# Patient Record
Sex: FEMALE | Race: WHITE | NOT HISPANIC OR LATINO | URBAN - METROPOLITAN AREA
[De-identification: names, ages, dates, MRNs, and addresses within clinical notes are randomized per-mention and may not be internally consistent; named-entity substitution may affect disease eponyms.]

---

## 2023-08-02 ENCOUNTER — OFFICE VISIT (OUTPATIENT)
Dept: PRIMARY CARE | Facility: CLINIC | Age: 43
End: 2023-08-02
Payer: COMMERCIAL

## 2023-08-02 VITALS
HEART RATE: 54 BPM | DIASTOLIC BLOOD PRESSURE: 82 MMHG | SYSTOLIC BLOOD PRESSURE: 134 MMHG | BODY MASS INDEX: 24.69 KG/M2 | WEIGHT: 135 LBS

## 2023-08-02 DIAGNOSIS — Z01.818 PREOPERATIVE CLEARANCE: Primary | ICD-10-CM

## 2023-08-02 DIAGNOSIS — Q21.12 PFO (PATENT FORAMEN OVALE) (HHS-HCC): ICD-10-CM

## 2023-08-02 PROBLEM — J30.2 SEASONAL ALLERGIES: Status: ACTIVE | Noted: 2023-08-02

## 2023-08-02 PROBLEM — Z86.73 HISTORY OF THROMBOTIC STROKE WITHOUT RESIDUAL DEFICITS: Status: ACTIVE | Noted: 2019-04-04

## 2023-08-02 PROBLEM — J34.2 NASAL SEPTAL DEVIATION: Status: ACTIVE | Noted: 2023-08-02

## 2023-08-02 PROBLEM — R53.83 FATIGUE: Status: ACTIVE | Noted: 2023-08-02

## 2023-08-02 PROBLEM — R09.81 CHRONIC NASAL CONGESTION: Status: ACTIVE | Noted: 2023-08-02

## 2023-08-02 PROBLEM — G47.33 OBSTRUCTIVE SLEEP APNEA: Status: ACTIVE | Noted: 2023-08-02

## 2023-08-02 PROBLEM — F41.9 ANXIETY: Status: ACTIVE | Noted: 2023-08-02

## 2023-08-02 PROBLEM — H93.293 ABNORMAL AUDITORY PERCEPTION OF BOTH EARS: Status: ACTIVE | Noted: 2023-08-02

## 2023-08-02 PROBLEM — R51.9 HEADACHE: Status: ACTIVE | Noted: 2020-02-05

## 2023-08-02 PROBLEM — R93.1 ABNORMAL FINDING ON ECHOCARDIOGRAM: Status: ACTIVE | Noted: 2023-08-02

## 2023-08-02 PROBLEM — R10.11 CHRONIC RUQ PAIN: Status: ACTIVE | Noted: 2023-08-02

## 2023-08-02 PROBLEM — N92.0 MENORRHAGIA WITH REGULAR CYCLE: Status: ACTIVE | Noted: 2022-06-14

## 2023-08-02 PROBLEM — Z97.5 BREAKTHROUGH BLEEDING ASSOCIATED WITH INTRAUTERINE DEVICE (IUD): Status: ACTIVE | Noted: 2022-06-14

## 2023-08-02 PROBLEM — G89.29 CHRONIC RUQ PAIN: Status: ACTIVE | Noted: 2023-08-02

## 2023-08-02 PROBLEM — R42 DIZZINESS: Status: ACTIVE | Noted: 2023-08-02

## 2023-08-02 PROBLEM — R19.07 GENERALIZED ABDOMINAL OR PELVIC SWELLING OR MASS OR LUMP: Status: ACTIVE | Noted: 2019-04-04

## 2023-08-02 PROBLEM — K21.9 GERD (GASTROESOPHAGEAL REFLUX DISEASE): Status: ACTIVE | Noted: 2023-08-01

## 2023-08-02 PROBLEM — G43.909 MIGRAINE HEADACHE: Status: ACTIVE | Noted: 2023-08-02

## 2023-08-02 PROBLEM — N92.1 BREAKTHROUGH BLEEDING ASSOCIATED WITH INTRAUTERINE DEVICE (IUD): Status: ACTIVE | Noted: 2022-06-14

## 2023-08-02 PROBLEM — D21.9 FIBROIDS: Status: ACTIVE | Noted: 2022-06-14

## 2023-08-02 PROBLEM — N94.5 SECONDARY DYSMENORRHEA: Status: ACTIVE | Noted: 2019-04-04

## 2023-08-02 PROBLEM — N94.12 DEEP DYSPAREUNIA: Status: ACTIVE | Noted: 2022-06-14

## 2023-08-02 PROBLEM — J32.9 RECURRENT SINUSITIS: Status: ACTIVE | Noted: 2023-08-02

## 2023-08-02 PROCEDURE — 99214 OFFICE O/P EST MOD 30 MIN: CPT | Performed by: FAMILY MEDICINE

## 2023-08-02 PROCEDURE — 1036F TOBACCO NON-USER: CPT | Performed by: FAMILY MEDICINE

## 2023-08-02 RX ORDER — TRAMADOL HYDROCHLORIDE 50 MG/1
TABLET ORAL
COMMUNITY
Start: 2023-04-11 | End: 2024-02-09 | Stop reason: ALTCHOICE

## 2023-08-02 RX ORDER — MECLIZINE HYDROCHLORIDE 25 MG/1
25 TABLET ORAL AS NEEDED
COMMUNITY
Start: 2020-02-10

## 2023-08-02 RX ORDER — CITALOPRAM 40 MG/1
40 TABLET, FILM COATED ORAL DAILY
COMMUNITY
Start: 2023-07-11

## 2023-08-02 RX ORDER — PANTOPRAZOLE SODIUM 40 MG/1
40 TABLET, DELAYED RELEASE ORAL DAILY
COMMUNITY

## 2023-08-02 RX ORDER — FERROUS SULFATE 325(65) MG
325 TABLET ORAL
Qty: 30 TABLET | Refills: 2 | COMMUNITY
Start: 2023-06-15 | End: 2023-09-13

## 2023-08-02 RX ORDER — NAPROXEN SODIUM 220 MG/1
81 TABLET, FILM COATED ORAL
COMMUNITY
Start: 2020-02-06

## 2023-08-02 ASSESSMENT — ENCOUNTER SYMPTOMS
DIZZINESS: 0
FACIAL ASYMMETRY: 0
CHEST TIGHTNESS: 0
HEADACHES: 0
ARTHRALGIAS: 0
COUGH: 0
BACK PAIN: 0
CHOKING: 0
ACTIVITY CHANGE: 0
FATIGUE: 0
FEVER: 0
APPETITE CHANGE: 0
COLOR CHANGE: 0
LIGHT-HEADEDNESS: 0
PALPITATIONS: 0

## 2023-08-02 NOTE — PROGRESS NOTES
Subjective   Patient ID: Sarahi Carl is a 43 y.o. female who presents for Pre-op Exam (Dr Mathis 8-8-23. Hysterectomy).    HPI   Patient presents for surgical clearance for hysterectomy.     Fam Hx  Mom () living,  Dad () living,     Exercise walks, runs  ETOH denies  Caffeine denies  Tobacco denies     OB/GYN Dr. Day     Mammogram due 2023  DEXA @ 67  Colonoscopy @ 47     Patient denies other complaints.   Review of Systems   Constitutional:  Negative for activity change, appetite change, fatigue and fever.   HENT:  Negative for congestion.    Respiratory:  Negative for cough, choking and chest tightness.    Cardiovascular:  Negative for chest pain, palpitations and leg swelling.   Genitourinary:  Positive for vaginal bleeding.   Musculoskeletal:  Negative for arthralgias, back pain and gait problem.   Skin:  Negative for color change and pallor.   Neurological:  Negative for dizziness, facial asymmetry, light-headedness and headaches.       Objective   /82 (BP Location: Right arm)   Pulse 54   Wt 61.2 kg (135 lb)   BMI 24.69 kg/m²   BSA Body surface area is 1.64 meters squared.      Physical Exam  Constitutional:       General: She is not in acute distress.     Appearance: Normal appearance. She is not toxic-appearing.   HENT:      Head: Normocephalic.      Right Ear: Tympanic membrane, ear canal and external ear normal.      Left Ear: Tympanic membrane, ear canal and external ear normal.      Nose: Nose normal.      Mouth/Throat:      Pharynx: Oropharynx is clear.   Eyes:      Conjunctiva/sclera: Conjunctivae normal.      Pupils: Pupils are equal, round, and reactive to light.   Cardiovascular:      Rate and Rhythm: Normal rate and regular rhythm.      Pulses: Normal pulses.      Heart sounds: Normal heart sounds.   Pulmonary:      Effort: No respiratory distress.      Breath sounds: No wheezing, rhonchi or rales.   Abdominal:      General: Bowel sounds are normal. There is no distension.       Palpations: Abdomen is soft.      Tenderness: There is no abdominal tenderness.   Musculoskeletal:         General: No swelling or tenderness.      Cervical back: No tenderness.   Skin:     Findings: No lesion or rash.   Neurological:      General: No focal deficit present.      Mental Status: She is alert and oriented to person, place, and time. Mental status is at baseline.      Gait: Gait normal.   Psychiatric:         Mood and Affect: Mood normal.         Behavior: Behavior normal.         Thought Content: Thought content normal.         Judgment: Judgment normal.       Legacy Encounter on 12/28/2022   Component Date Value Ref Range Status    WBC 12/28/2022 6.1  4.4 - 11.3 x10E9/L Final    RBC 12/28/2022 3.51 (L)  4.00 - 5.20 x10E12/L Final    Hemoglobin 12/28/2022 11.6 (L)  12.0 - 16.0 g/dL Final    Hematocrit 12/28/2022 36.2  36.0 - 46.0 % Final    MCV 12/28/2022 103 (H)  80 - 100 fL Final    MCHC 12/28/2022 32.0  32.0 - 36.0 g/dL Final    Platelets 12/28/2022 342  150 - 450 x10E9/L Final    RDW 12/28/2022 13.1  11.5 - 14.5 % Final    Neutrophils % 12/28/2022 56.1  40.0 - 80.0 % Final    Immature Granulocytes %, Automated 12/28/2022 0.7  0.0 - 0.9 % Final    Comment:  Immature Granulocyte Count (IG) includes promyelocytes,    myelocytes and metamyelocytes but does not include bands.   Percent differential counts (%) should be interpreted in the   context of the absolute cell counts (cells/L).      Lymphocytes % 12/28/2022 33.3  13.0 - 44.0 % Final    Monocytes % 12/28/2022 6.4  2.0 - 10.0 % Final    Eosinophils % 12/28/2022 2.5  0.0 - 6.0 % Final    Basophils % 12/28/2022 1.0  0.0 - 2.0 % Final    Neutrophils Absolute 12/28/2022 3.42  1.20 - 7.70 x10E9/L Final    Lymphocytes Absolute 12/28/2022 2.03  1.20 - 4.80 x10E9/L Final    Monocytes Absolute 12/28/2022 0.39  0.10 - 1.00 x10E9/L Final    Eosinophils Absolute 12/28/2022 0.15  0.00 - 0.70 x10E9/L Final    Basophils Absolute 12/28/2022 0.06  0.00 - 0.10  x10E9/L Final    Vitamin D, 25-Hydroxy 12/28/2022 20 (A)  ng/mL Final    Comment: .  DEFICIENCY:         < 20   NG/ML  INSUFFICIENCY:      20-29  NG/ML  SUFFICIENCY:         NG/ML    THIS ASSAY ACCURATELY QUANTIFIES THE SUM OF  VITAMIN D3, 25-HYDROXY AND VIT D2,25-HYDROXY.      TSH 12/28/2022 4.55 (H)  0.44 - 3.98 mIU/L Final    Comment:  TSH testing is performed using different testing    methodology at [facility] than at other    [facility]. Direct result comparisons should    only be made within the same method.      Glucose 12/28/2022 74  74 - 99 mg/dL Final    Sodium 12/28/2022 137  136 - 145 mmol/L Final    Potassium 12/28/2022 4.3  3.5 - 5.3 mmol/L Final    Chloride 12/28/2022 101  98 - 107 mmol/L Final    Bicarbonate 12/28/2022 28  21 - 32 mmol/L Final    Anion Gap 12/28/2022 12  10 - 20 mmol/L Final    Urea Nitrogen 12/28/2022 9  6 - 23 mg/dL Final    Creatinine 12/28/2022 0.73  0.50 - 1.05 mg/dL Final    GFR Female 12/28/2022 >90  >90 mL/min/1.73m2 Final    Comment:  CALCULATIONS OF ESTIMATED GFR ARE PERFORMED   USING THE 2021 CKD-EPI STUDY REFIT EQUATION   WITHOUT THE RACE VARIABLE FOR THE IDMS-TRACEABLE   CREATININE METHODS.    https://jasn.asnjournals.org/content/early/2021/09/22/ASN.0908181446      Calcium 12/28/2022 9.0  8.6 - 10.3 mg/dL Final    Albumin 12/28/2022 4.1  3.4 - 5.0 g/dL Final    Alkaline Phosphatase 12/28/2022 83  33 - 110 U/L Final    Total Protein 12/28/2022 7.2  6.4 - 8.2 g/dL Final    AST 12/28/2022 45 (H)  9 - 39 U/L Final    Total Bilirubin 12/28/2022 0.4  0.0 - 1.2 mg/dL Final    ALT (SGPT) 12/28/2022 47 (H)  7 - 45 U/L Final    Comment:  Patients treated with Sulfasalazine may generate    falsely decreased results for ALT.      Cholesterol 12/28/2022 213 (H)  0 - 199 mg/dL Final    Comment: .      AGE      DESIRABLE   BORDERLINE HIGH   HIGH     0-19 Y     0 - 169       170 - 199     >/= 200    20-24 Y     0 - 189       190 - 224     >/= 225         >24  Y     0 - 199       200 - 239     >/= 240   **All ranges are based on fasting samples. Specific   therapeutic targets will vary based on patient-specific   cardiac risk.  .   Pediatric guidelines reference:Pediatrics 2011, 128(S5).   Adult guidelines reference: NCEP ATPIII Guidelines,     DM 2001, 258:2486-97  .   Venipuncture immediately after or during the    administration of Metamizole may lead to falsely   low results. Testing should be performed immediately   prior to Metamizole dosing.      HDL 12/28/2022 53.5  mg/dL Final    Comment: .      AGE      VERY LOW   LOW     NORMAL    HIGH       0-19 Y       < 35   < 40     40-45     ----    20-24 Y       ----   < 40       >45     ----      >24 Y       ----   < 40     40-60      >60  .      Cholesterol/HDL Ratio 12/28/2022 4.0   Final    Comment: REF VALUES  DESIRABLE  < 3.4  HIGH RISK  > 5.0      LDL 12/28/2022 103 (H)  0 - 99 mg/dL Final    Comment: .                           NEAR      BORD      AGE      DESIRABLE  OPTIMAL    HIGH     HIGH     VERY HIGH     0-19 Y     0 - 109     ---    110-129   >/= 130     ----    20-24 Y     0 - 119     ---    120-159   >/= 160     ----      >24 Y     0 -  99   100-129  130-159   160-189     >/=190  .      VLDL 12/28/2022 56 (H)  0 - 40 mg/dL Final    Triglycerides 12/28/2022 281 (H)  0 - 149 mg/dL Final    Comment: .      AGE      DESIRABLE   BORDERLINE HIGH   HIGH     VERY HIGH   0 D-90 D    19 - 174         ----         ----        ----  91 D- 9 Y     0 -  74        75 -  99     >/= 100      ----    10-19 Y     0 -  89        90 - 129     >/= 130      ----    20-24 Y     0 - 114       115 - 149     >/= 150      ----         >24 Y     0 - 149       150 - 199    200- 499    >/= 500  .   Venipuncture immediately after or during the    administration of Metamizole may lead to falsely   low results. Testing should be performed immediately   prior to Metamizole dosing.      Non HDL Cholesterol 12/28/2022 160  mg/dL Final     Comment:     AGE      DESIRABLE   BORDERLINE HIGH   HIGH     VERY HIGH     0-19 Y     0 - 119       120 - 144     >/= 145    >/= 160    20-24 Y     0 - 149       150 - 189     >/= 190      ----         >24 Y    30 MG/DL ABOVE LDL CHOLESTEROL GOAL  .      Free T4 12/28/2022 0.76  0.61 - 1.12 ng/dL Final    Comment:  Thyroxine Free testing is performed using different testing    methodology at [facility] than at other    [facility]. Direct result comparisons should    only be made within the same method.  .   Biotin can cause falsely elevated free T4 results. Patients   taking a Biotin dose of up to 10 mg/day should refrain from   taking Biotin for 24 hours before sample collection. Patient   taking a Biotin dose of >10 mg/day should consult with their   physician or the laboratory before the blood draw.       Current Outpatient Medications on File Prior to Visit   Medication Sig Dispense Refill    aspirin 81 mg chewable tablet Chew 1 tablet (81 mg) once daily.      citalopram (CeleXA) 40 mg tablet Take 1 tablet (40 mg) by mouth once daily.      ferrous sulfate 325 (65 Fe) MG tablet Take 1 tablet (325 mg) by mouth once daily. 30 tablet 2    meclizine (Antivert) 25 mg tablet Take 1 tablet (25 mg) by mouth if needed.      pantoprazole (ProtoNix) 40 mg EC tablet Take 1 tablet (40 mg) by mouth once daily.      traMADol (Ultram) 50 mg tablet TAKE 1 TABLET BY MOUTH EVERY 6 HOURS FOR UP TO 7 DAYS AS NEEDED FOR PAIN       No current facility-administered medications on file prior to visit.     No images are attached to the encounter.            Assessment/Plan   Diagnoses and all orders for this visit:  Preoperative clearance  PFO (patent foramen ovale)    Patient is low risk for a moderate risk surgery, getting cardio clearance on August 7th, 2023  Patient to call if questions or concerns

## 2024-02-09 ENCOUNTER — OFFICE VISIT (OUTPATIENT)
Dept: PRIMARY CARE | Facility: CLINIC | Age: 44
End: 2024-02-09
Payer: COMMERCIAL

## 2024-02-09 VITALS
WEIGHT: 129 LBS | BODY MASS INDEX: 23.59 KG/M2 | TEMPERATURE: 97.3 F | HEART RATE: 71 BPM | SYSTOLIC BLOOD PRESSURE: 128 MMHG | DIASTOLIC BLOOD PRESSURE: 78 MMHG

## 2024-02-09 DIAGNOSIS — S01.81XD FACIAL LACERATION, SUBSEQUENT ENCOUNTER: Primary | ICD-10-CM

## 2024-02-09 PROCEDURE — 1036F TOBACCO NON-USER: CPT | Performed by: FAMILY MEDICINE

## 2024-02-09 PROCEDURE — 99214 OFFICE O/P EST MOD 30 MIN: CPT | Performed by: FAMILY MEDICINE

## 2024-02-09 RX ORDER — FAMOTIDINE 20 MG/1
20 TABLET, FILM COATED ORAL 2 TIMES DAILY
COMMUNITY
Start: 2023-08-23

## 2024-02-09 ASSESSMENT — ENCOUNTER SYMPTOMS
COLOR CHANGE: 0
COUGH: 0
DIZZINESS: 0
PALPITATIONS: 0
CHEST TIGHTNESS: 0
BACK PAIN: 0
HEADACHES: 0
APPETITE CHANGE: 0
CHOKING: 0
LIGHT-HEADEDNESS: 0
ACTIVITY CHANGE: 0
ARTHRALGIAS: 0
WOUND: 1
FACIAL ASYMMETRY: 0
FATIGUE: 0
FEVER: 0

## 2024-02-09 ASSESSMENT — PATIENT HEALTH QUESTIONNAIRE - PHQ9
SUM OF ALL RESPONSES TO PHQ9 QUESTIONS 1 AND 2: 0
10. IF YOU CHECKED OFF ANY PROBLEMS, HOW DIFFICULT HAVE THESE PROBLEMS MADE IT FOR YOU TO DO YOUR WORK, TAKE CARE OF THINGS AT HOME, OR GET ALONG WITH OTHER PEOPLE: NOT DIFFICULT AT ALL
1. LITTLE INTEREST OR PLEASURE IN DOING THINGS: NOT AT ALL
2. FEELING DOWN, DEPRESSED OR HOPELESS: NOT AT ALL

## 2024-02-09 NOTE — PROGRESS NOTES
"Subjective   Patient ID: Sarahi Booker is a 44 y.o. female who presents for Suture / Staple Removal (# 5 left side of face. ).    HPI   Reports she is here to have removal of 5 sutures from her face.  She reports she was seen in the ER on 2/4.   Patient reports that her son was disassembling his guitar she hit her face on the amp.    Review of Systems   Constitutional:  Negative for activity change, appetite change, fatigue and fever.   HENT:  Negative for congestion.    Respiratory:  Negative for cough, choking and chest tightness.    Cardiovascular:  Negative for chest pain, palpitations and leg swelling.   Musculoskeletal:  Negative for arthralgias, back pain and gait problem.   Skin:  Positive for rash and wound. Negative for color change and pallor.   Neurological:  Negative for dizziness, facial asymmetry, light-headedness and headaches.       Objective   /78 (BP Location: Right arm)   Pulse 71   Temp 36.3 °C (97.3 °F)   Wt 58.5 kg (129 lb)   BMI 23.59 kg/m²   BSA Body surface area is 1.6 meters squared.      Physical Exam  Constitutional:       General: She is not in acute distress.     Appearance: Normal appearance. She is not toxic-appearing.   HENT:      Head: Normocephalic.   Eyes:      Conjunctiva/sclera: Conjunctivae normal.      Pupils: Pupils are equal, round, and reactive to light.   Cardiovascular:      Rate and Rhythm: Normal rate and regular rhythm.      Pulses: Normal pulses.      Heart sounds: Normal heart sounds.   Pulmonary:      Effort: No respiratory distress.      Breath sounds: No wheezing, rhonchi or rales.   Musculoskeletal:         General: No swelling or tenderness.   Skin:     Findings: Lesion present. No rash.      Comments: 5 interrupted sutures 1/2\" well approximated laceration on left chin   Neurological:      General: No focal deficit present.      Mental Status: She is alert and oriented to person, place, and time. Mental status is at baseline.      Gait: Gait " normal.   Psychiatric:         Mood and Affect: Mood normal.         Behavior: Behavior normal.         Thought Content: Thought content normal.         Judgment: Judgment normal.       No visits with results within 1 Year(s) from this visit.   Latest known visit with results is:   Legacy Encounter on 12/28/2022   Component Date Value Ref Range Status    WBC 12/28/2022 6.1  4.4 - 11.3 x10E9/L Final    RBC 12/28/2022 3.51 (L)  4.00 - 5.20 x10E12/L Final    Hemoglobin 12/28/2022 11.6 (L)  12.0 - 16.0 g/dL Final    Hematocrit 12/28/2022 36.2  36.0 - 46.0 % Final    MCV 12/28/2022 103 (H)  80 - 100 fL Final    MCHC 12/28/2022 32.0  32.0 - 36.0 g/dL Final    Platelets 12/28/2022 342  150 - 450 x10E9/L Final    RDW 12/28/2022 13.1  11.5 - 14.5 % Final    Neutrophils % 12/28/2022 56.1  40.0 - 80.0 % Final    Immature Granulocytes %, Automated 12/28/2022 0.7  0.0 - 0.9 % Final    Comment:  Immature Granulocyte Count (IG) includes promyelocytes,    myelocytes and metamyelocytes but does not include bands.   Percent differential counts (%) should be interpreted in the   context of the absolute cell counts (cells/L).      Lymphocytes % 12/28/2022 33.3  13.0 - 44.0 % Final    Monocytes % 12/28/2022 6.4  2.0 - 10.0 % Final    Eosinophils % 12/28/2022 2.5  0.0 - 6.0 % Final    Basophils % 12/28/2022 1.0  0.0 - 2.0 % Final    Neutrophils Absolute 12/28/2022 3.42  1.20 - 7.70 x10E9/L Final    Lymphocytes Absolute 12/28/2022 2.03  1.20 - 4.80 x10E9/L Final    Monocytes Absolute 12/28/2022 0.39  0.10 - 1.00 x10E9/L Final    Eosinophils Absolute 12/28/2022 0.15  0.00 - 0.70 x10E9/L Final    Basophils Absolute 12/28/2022 0.06  0.00 - 0.10 x10E9/L Final    Vitamin D, 25-Hydroxy 12/28/2022 20 (A)  ng/mL Final    Comment: .  DEFICIENCY:         < 20   NG/ML  INSUFFICIENCY:      20-29  NG/ML  SUFFICIENCY:         NG/ML    THIS ASSAY ACCURATELY QUANTIFIES THE SUM OF  VITAMIN D3, 25-HYDROXY AND VIT D2,25-HYDROXY.      TSH 12/28/2022  4.55 (H)  0.44 - 3.98 mIU/L Final    Comment:  TSH testing is performed using different testing    methodology at [facility] than at other    [facility] hospitals. Direct result comparisons should    only be made within the same method.      Glucose 12/28/2022 74  74 - 99 mg/dL Final    Sodium 12/28/2022 137  136 - 145 mmol/L Final    Potassium 12/28/2022 4.3  3.5 - 5.3 mmol/L Final    Chloride 12/28/2022 101  98 - 107 mmol/L Final    Bicarbonate 12/28/2022 28  21 - 32 mmol/L Final    Anion Gap 12/28/2022 12  10 - 20 mmol/L Final    Urea Nitrogen 12/28/2022 9  6 - 23 mg/dL Final    Creatinine 12/28/2022 0.73  0.50 - 1.05 mg/dL Final    GFR Female 12/28/2022 >90  >90 mL/min/1.73m2 Final    Comment:  CALCULATIONS OF ESTIMATED GFR ARE PERFORMED   USING THE 2021 CKD-EPI STUDY REFIT EQUATION   WITHOUT THE RACE VARIABLE FOR THE IDMS-TRACEABLE   CREATININE METHODS.    https://jasn.asnjournals.org/content/early/2021/09/22/ASN.2392902811      Calcium 12/28/2022 9.0  8.6 - 10.3 mg/dL Final    Albumin 12/28/2022 4.1  3.4 - 5.0 g/dL Final    Alkaline Phosphatase 12/28/2022 83  33 - 110 U/L Final    Total Protein 12/28/2022 7.2  6.4 - 8.2 g/dL Final    AST 12/28/2022 45 (H)  9 - 39 U/L Final    Total Bilirubin 12/28/2022 0.4  0.0 - 1.2 mg/dL Final    ALT (SGPT) 12/28/2022 47 (H)  7 - 45 U/L Final    Comment:  Patients treated with Sulfasalazine may generate    falsely decreased results for ALT.      Cholesterol 12/28/2022 213 (H)  0 - 199 mg/dL Final    Comment: .      AGE      DESIRABLE   BORDERLINE HIGH   HIGH     0-19 Y     0 - 169       170 - 199     >/= 200    20-24 Y     0 - 189       190 - 224     >/= 225         >24 Y     0 - 199       200 - 239     >/= 240   **All ranges are based on fasting samples. Specific   therapeutic targets will vary based on patient-specific   cardiac risk.  .   Pediatric guidelines reference:Pediatrics 2011, 128(S5).   Adult guidelines reference: NCEP ATPIII Guidelines,     DM 2001,  258:[id number]  .   Venipuncture immediately after or during the    administration of Metamizole may lead to falsely   low results. Testing should be performed immediately   prior to Metamizole dosing.      HDL 12/28/2022 53.5  mg/dL Final    Comment: .      AGE      VERY LOW   LOW     NORMAL    HIGH       0-19 Y       < 35   < 40     40-45     ----    20-24 Y       ----   < 40       >45     ----      >24 Y       ----   < 40     40-60      >60  .      Cholesterol/HDL Ratio 12/28/2022 4.0   Final    Comment: REF VALUES  DESIRABLE  < 3.4  HIGH RISK  > 5.0      LDL 12/28/2022 103 (H)  0 - 99 mg/dL Final    Comment: .                           NEAR      BORD      AGE      DESIRABLE  OPTIMAL    HIGH     HIGH     VERY HIGH     0-19 Y     0 - 109     ---    110-129   >/= 130     ----    20-24 Y     0 - 119     ---    120-159   >/= 160     ----      >24 Y     0 -  99   100-129  130-159   160-189     >/=190  .      VLDL 12/28/2022 56 (H)  0 - 40 mg/dL Final    Triglycerides 12/28/2022 281 (H)  0 - 149 mg/dL Final    Comment: .      AGE      DESIRABLE   BORDERLINE HIGH   HIGH     VERY HIGH   0 D-90 D    19 - 174         ----         ----        ----  91 D- 9 Y     0 -  74        75 -  99     >/= 100      ----    10-19 Y     0 -  89        90 - 129     >/= 130      ----    20-24 Y     0 - 114       115 - 149     >/= 150      ----         >24 Y     0 - 149       150 - 199    200- 499    >/= 500  .   Venipuncture immediately after or during the    administration of Metamizole may lead to falsely   low results. Testing should be performed immediately   prior to Metamizole dosing.      Non HDL Cholesterol 12/28/2022 160  mg/dL Final    Comment:     AGE      DESIRABLE   BORDERLINE HIGH   HIGH     VERY HIGH     0-19 Y     0 - 119       120 - 144     >/= 145    >/= 160    20-24 Y     0 - 149       150 - 189     >/= 190      ----         >24 Y    30 MG/DL ABOVE LDL CHOLESTEROL GOAL  .      Free T4 12/28/2022 0.76  0.61 - 1.12 ng/dL Final     Comment:  Thyroxine Free testing is performed using different testing    methodology at [facility] than at other    [facility]. Direct result comparisons should    only be made within the same method.  .   Biotin can cause falsely elevated free T4 results. Patients   taking a Biotin dose of up to 10 mg/day should refrain from   taking Biotin for 24 hours before sample collection. Patient   taking a Biotin dose of >10 mg/day should consult with their   physician or the laboratory before the blood draw.       Current Outpatient Medications on File Prior to Visit   Medication Sig Dispense Refill    aspirin 81 mg chewable tablet Chew 1 tablet (81 mg) once daily.      famotidine (Pepcid) 20 mg tablet Take 1 tablet (20 mg) by mouth 2 times a day.      meclizine (Antivert) 25 mg tablet Take 1 tablet (25 mg) by mouth if needed.      pantoprazole (ProtoNix) 40 mg EC tablet Take 1 tablet (40 mg) by mouth once daily.      citalopram (CeleXA) 40 mg tablet Take 1 tablet (40 mg) by mouth once daily.      [DISCONTINUED] traMADol (Ultram) 50 mg tablet TAKE 1 TABLET BY MOUTH EVERY 6 HOURS FOR UP TO 7 DAYS AS NEEDED FOR PAIN       No current facility-administered medications on file prior to visit.     No images are attached to the encounter.            Assessment/Plan   Diagnoses and all orders for this visit:  Facial laceration, subsequent encounter    Patient to keep area clean and dry  Patient to call if questions or concerns

## 2024-02-14 ENCOUNTER — HOSPITAL ENCOUNTER (OUTPATIENT)
Dept: RADIOLOGY | Facility: CLINIC | Age: 44
Discharge: HOME | End: 2024-02-14
Payer: COMMERCIAL

## 2024-02-14 ENCOUNTER — OFFICE VISIT (OUTPATIENT)
Dept: PRIMARY CARE | Facility: CLINIC | Age: 44
End: 2024-02-14
Payer: COMMERCIAL

## 2024-02-14 VITALS
OXYGEN SATURATION: 97 % | DIASTOLIC BLOOD PRESSURE: 89 MMHG | TEMPERATURE: 97.8 F | HEART RATE: 70 BPM | RESPIRATION RATE: 16 BRPM | WEIGHT: 132 LBS | SYSTOLIC BLOOD PRESSURE: 151 MMHG | BODY MASS INDEX: 24.14 KG/M2

## 2024-02-14 DIAGNOSIS — M25.571 ACUTE RIGHT ANKLE PAIN: Primary | ICD-10-CM

## 2024-02-14 DIAGNOSIS — M25.571 ACUTE RIGHT ANKLE PAIN: ICD-10-CM

## 2024-02-14 PROCEDURE — 73610 X-RAY EXAM OF ANKLE: CPT | Mod: RIGHT SIDE | Performed by: RADIOLOGY

## 2024-02-14 PROCEDURE — 99213 OFFICE O/P EST LOW 20 MIN: CPT | Performed by: NURSE PRACTITIONER

## 2024-02-14 PROCEDURE — 73610 X-RAY EXAM OF ANKLE: CPT | Mod: RT

## 2024-02-14 PROCEDURE — 1036F TOBACCO NON-USER: CPT | Performed by: NURSE PRACTITIONER

## 2024-02-14 ASSESSMENT — PATIENT HEALTH QUESTIONNAIRE - PHQ9
SUM OF ALL RESPONSES TO PHQ9 QUESTIONS 1 AND 2: 0
1. LITTLE INTEREST OR PLEASURE IN DOING THINGS: NOT AT ALL
2. FEELING DOWN, DEPRESSED OR HOPELESS: NOT AT ALL
10. IF YOU CHECKED OFF ANY PROBLEMS, HOW DIFFICULT HAVE THESE PROBLEMS MADE IT FOR YOU TO DO YOUR WORK, TAKE CARE OF THINGS AT HOME, OR GET ALONG WITH OTHER PEOPLE: NOT DIFFICULT AT ALL

## 2024-02-14 ASSESSMENT — ENCOUNTER SYMPTOMS
ABDOMINAL PAIN: 0
LOSS OF SENSATION IN FEET: 0
SHORTNESS OF BREATH: 0
CHILLS: 0
NAUSEA: 0
DIARRHEA: 0
VOMITING: 0
COLOR CHANGE: 0
FEVER: 0
OCCASIONAL FEELINGS OF UNSTEADINESS: 0
DEPRESSION: 0
COUGH: 0

## 2024-02-14 NOTE — PATIENT INSTRUCTIONS
Obtain xr ordered   We will call you with results  Recommend 600mg ibuprofen twice daily for 5 days  May use tylenol   Recommend Ice 20 minutes twice daily

## 2024-02-14 NOTE — PROGRESS NOTES
Subjective   Chief Complaint: Ankle Pain (Right;  states she woke up with right ankle pain.  No injury or trauma recalled.).    HPI   Sarahi Booker is a 44 y.o. female who presents for Ankle Pain (Right;  states she woke up with right ankle pain.  No injury or trauma recalled.).      Reports right ankle pain that started this morning after waking up  Hurts to bare weight on her right side. Internal rotation of right foot hurts    Patient denies anything unusual yesterday. Reports that she works from home, went out to dinner yesterday and came home and got the kids stuff ready for school.    Denies any known injury.     Took tylenol and advil with mild relief.   Denies discoloration or swelling of the area. No redness       Review of Systems   Constitutional:  Negative for chills and fever.   Respiratory:  Negative for cough and shortness of breath.    Cardiovascular:  Negative for chest pain and leg swelling.   Gastrointestinal:  Negative for abdominal pain, diarrhea, nausea and vomiting.   Musculoskeletal:         Right ankle pain    Skin:  Negative for color change and rash.       Objective   /89 (BP Location: Left arm, Patient Position: Sitting, BP Cuff Size: Adult)   Pulse 70   Temp 36.6 °C (97.8 °F)   Resp 16   Wt 59.9 kg (132 lb)   LMP  (LMP Unknown)   SpO2 97%   BMI 24.14 kg/m²   BSA Body surface area is 1.62 meters squared.      Physical Exam  Constitutional:       Appearance: Normal appearance.   Musculoskeletal:         General: Tenderness present. No swelling or signs of injury.      Right lower leg: No edema.      Left lower leg: No edema.   Skin:     General: Skin is dry.      Findings: No rash.   Neurological:      General: No focal deficit present.      Mental Status: She is alert.       No visits with results within 1 Year(s) from this visit.   Latest known visit with results is:   Legacy Encounter on 12/28/2022   Component Date Value Ref Range Status    WBC 12/28/2022 6.1  4.4 -  11.3 x10E9/L Final    RBC 12/28/2022 3.51 (L)  4.00 - 5.20 x10E12/L Final    Hemoglobin 12/28/2022 11.6 (L)  12.0 - 16.0 g/dL Final    Hematocrit 12/28/2022 36.2  36.0 - 46.0 % Final    MCV 12/28/2022 103 (H)  80 - 100 fL Final    MCHC 12/28/2022 32.0  32.0 - 36.0 g/dL Final    Platelets 12/28/2022 342  150 - 450 x10E9/L Final    RDW 12/28/2022 13.1  11.5 - 14.5 % Final    Neutrophils % 12/28/2022 56.1  40.0 - 80.0 % Final    Immature Granulocytes %, Automated 12/28/2022 0.7  0.0 - 0.9 % Final    Comment:  Immature Granulocyte Count (IG) includes promyelocytes,    myelocytes and metamyelocytes but does not include bands.   Percent differential counts (%) should be interpreted in the   context of the absolute cell counts (cells/L).      Lymphocytes % 12/28/2022 33.3  13.0 - 44.0 % Final    Monocytes % 12/28/2022 6.4  2.0 - 10.0 % Final    Eosinophils % 12/28/2022 2.5  0.0 - 6.0 % Final    Basophils % 12/28/2022 1.0  0.0 - 2.0 % Final    Neutrophils Absolute 12/28/2022 3.42  1.20 - 7.70 x10E9/L Final    Lymphocytes Absolute 12/28/2022 2.03  1.20 - 4.80 x10E9/L Final    Monocytes Absolute 12/28/2022 0.39  0.10 - 1.00 x10E9/L Final    Eosinophils Absolute 12/28/2022 0.15  0.00 - 0.70 x10E9/L Final    Basophils Absolute 12/28/2022 0.06  0.00 - 0.10 x10E9/L Final    Vitamin D, 25-Hydroxy 12/28/2022 20 (A)  ng/mL Final    Comment: .  DEFICIENCY:         < 20   NG/ML  INSUFFICIENCY:      20-29  NG/ML  SUFFICIENCY:         NG/ML    THIS ASSAY ACCURATELY QUANTIFIES THE SUM OF  VITAMIN D3, 25-HYDROXY AND VIT D2,25-HYDROXY.      TSH 12/28/2022 4.55 (H)  0.44 - 3.98 mIU/L Final    Comment:  TSH testing is performed using different testing    methodology at [facility] than at other    [facility] hospitals. Direct result comparisons should    only be made within the same method.      Glucose 12/28/2022 74  74 - 99 mg/dL Final    Sodium 12/28/2022 137  136 - 145 mmol/L Final    Potassium 12/28/2022 4.3  3.5 - 5.3  mmol/L Final    Chloride 12/28/2022 101  98 - 107 mmol/L Final    Bicarbonate 12/28/2022 28  21 - 32 mmol/L Final    Anion Gap 12/28/2022 12  10 - 20 mmol/L Final    Urea Nitrogen 12/28/2022 9  6 - 23 mg/dL Final    Creatinine 12/28/2022 0.73  0.50 - 1.05 mg/dL Final    GFR Female 12/28/2022 >90  >90 mL/min/1.73m2 Final    Comment:  CALCULATIONS OF ESTIMATED GFR ARE PERFORMED   USING THE 2021 CKD-EPI STUDY REFIT EQUATION   WITHOUT THE RACE VARIABLE FOR THE IDMS-TRACEABLE   CREATININE METHODS.    https://jasn.asnjournals.org/content/early/2021/09/22/ASN.7962970600      Calcium 12/28/2022 9.0  8.6 - 10.3 mg/dL Final    Albumin 12/28/2022 4.1  3.4 - 5.0 g/dL Final    Alkaline Phosphatase 12/28/2022 83  33 - 110 U/L Final    Total Protein 12/28/2022 7.2  6.4 - 8.2 g/dL Final    AST 12/28/2022 45 (H)  9 - 39 U/L Final    Total Bilirubin 12/28/2022 0.4  0.0 - 1.2 mg/dL Final    ALT (SGPT) 12/28/2022 47 (H)  7 - 45 U/L Final    Comment:  Patients treated with Sulfasalazine may generate    falsely decreased results for ALT.      Cholesterol 12/28/2022 213 (H)  0 - 199 mg/dL Final    Comment: .      AGE      DESIRABLE   BORDERLINE HIGH   HIGH     0-19 Y     0 - 169       170 - 199     >/= 200    20-24 Y     0 - 189       190 - 224     >/= 225         >24 Y     0 - 199       200 - 239     >/= 240   **All ranges are based on fasting samples. Specific   therapeutic targets will vary based on patient-specific   cardiac risk.  .   Pediatric guidelines reference:Pediatrics 2011, 128(S5).   Adult guidelines reference: NCEP ATPIII Guidelines,     DM 2001, 258:2486-97  .   Venipuncture immediately after or during the    administration of Metamizole may lead to falsely   low results. Testing should be performed immediately   prior to Metamizole dosing.      HDL 12/28/2022 53.5  mg/dL Final    Comment: .      AGE      VERY LOW   LOW     NORMAL    HIGH       0-19 Y       < 35   < 40     40-45     ----    20-24 Y       ----   < 40        >45     ----      >24 Y       ----   < 40     40-60      >60  .      Cholesterol/HDL Ratio 12/28/2022 4.0   Final    Comment: REF VALUES  DESIRABLE  < 3.4  HIGH RISK  > 5.0      LDL 12/28/2022 103 (H)  0 - 99 mg/dL Final    Comment: .                           NEAR      BORD      AGE      DESIRABLE  OPTIMAL    HIGH     HIGH     VERY HIGH     0-19 Y     0 - 109     ---    110-129   >/= 130     ----    20-24 Y     0 - 119     ---    120-159   >/= 160     ----      >24 Y     0 -  99   100-129  130-159   160-189     >/=190  .      VLDL 12/28/2022 56 (H)  0 - 40 mg/dL Final    Triglycerides 12/28/2022 281 (H)  0 - 149 mg/dL Final    Comment: .      AGE      DESIRABLE   BORDERLINE HIGH   HIGH     VERY HIGH   0 D-90 D    19 - 174         ----         ----        ----  91 D- 9 Y     0 -  74        75 -  99     >/= 100      ----    10-19 Y     0 -  89        90 - 129     >/= 130      ----    20-24 Y     0 - 114       115 - 149     >/= 150      ----         >24 Y     0 - 149       150 - 199    200- 499    >/= 500  .   Venipuncture immediately after or during the    administration of Metamizole may lead to falsely   low results. Testing should be performed immediately   prior to Metamizole dosing.      Non HDL Cholesterol 12/28/2022 160  mg/dL Final    Comment:     AGE      DESIRABLE   BORDERLINE HIGH   HIGH     VERY HIGH     0-19 Y     0 - 119       120 - 144     >/= 145    >/= 160    20-24 Y     0 - 149       150 - 189     >/= 190      ----         >24 Y    30 MG/DL ABOVE LDL CHOLESTEROL GOAL  .      Free T4 12/28/2022 0.76  0.61 - 1.12 ng/dL Final    Comment:  Thyroxine Free testing is performed using different testing    methodology at [facility] than at other    [facility]. Direct result comparisons should    only be made within the same method.  .   Biotin can cause falsely elevated free T4 results. Patients   taking a Biotin dose of up to 10 mg/day should refrain from   taking Biotin for 24 hours  before sample collection. Patient   taking a Biotin dose of >10 mg/day should consult with their   physician or the laboratory before the blood draw.       Current Outpatient Medications on File Prior to Visit   Medication Sig Dispense Refill    aspirin 81 mg chewable tablet Chew 1 tablet (81 mg) once daily.      pantoprazole (ProtoNix) 40 mg EC tablet Take 1 tablet (40 mg) by mouth once daily.      citalopram (CeleXA) 40 mg tablet Take 1 tablet (40 mg) by mouth once daily.      famotidine (Pepcid) 20 mg tablet Take 1 tablet (20 mg) by mouth 2 times a day.      meclizine (Antivert) 25 mg tablet Take 1 tablet (25 mg) by mouth if needed.      [DISCONTINUED] traMADol (Ultram) 50 mg tablet TAKE 1 TABLET BY MOUTH EVERY 6 HOURS FOR UP TO 7 DAYS AS NEEDED FOR PAIN       No current facility-administered medications on file prior to visit.     No images are attached to the encounter.            Assessment/Plan   Problem List Items Addressed This Visit    None  Visit Diagnoses         Codes    Acute right ankle pain    -  Primary M25.571

## 2024-03-04 ENCOUNTER — OFFICE VISIT (OUTPATIENT)
Dept: PRIMARY CARE | Facility: CLINIC | Age: 44
End: 2024-03-04
Payer: COMMERCIAL

## 2024-03-04 VITALS
WEIGHT: 127.6 LBS | DIASTOLIC BLOOD PRESSURE: 88 MMHG | BODY MASS INDEX: 23.34 KG/M2 | HEART RATE: 80 BPM | TEMPERATURE: 98.2 F | SYSTOLIC BLOOD PRESSURE: 130 MMHG

## 2024-03-04 DIAGNOSIS — J02.9 SORE THROAT: Primary | ICD-10-CM

## 2024-03-04 DIAGNOSIS — J20.9 ACUTE BRONCHITIS, UNSPECIFIED ORGANISM: ICD-10-CM

## 2024-03-04 LAB
POC RAPID INFLUENZA A: NEGATIVE
POC RAPID INFLUENZA B: NEGATIVE

## 2024-03-04 PROCEDURE — 1036F TOBACCO NON-USER: CPT | Performed by: FAMILY MEDICINE

## 2024-03-04 PROCEDURE — 87634 RSV DNA/RNA AMP PROBE: CPT

## 2024-03-04 PROCEDURE — 87804 INFLUENZA ASSAY W/OPTIC: CPT | Performed by: FAMILY MEDICINE

## 2024-03-04 PROCEDURE — 99214 OFFICE O/P EST MOD 30 MIN: CPT | Performed by: FAMILY MEDICINE

## 2024-03-04 PROCEDURE — 87635 SARS-COV-2 COVID-19 AMP PRB: CPT

## 2024-03-04 RX ORDER — AZITHROMYCIN 250 MG/1
TABLET, FILM COATED ORAL
Qty: 6 TABLET | Refills: 0 | Status: SHIPPED | OUTPATIENT
Start: 2024-03-04 | End: 2024-03-09

## 2024-03-04 ASSESSMENT — ENCOUNTER SYMPTOMS
LIGHT-HEADEDNESS: 0
DIZZINESS: 0
ACTIVITY CHANGE: 0
FEVER: 1
DIARRHEA: 1
CHEST TIGHTNESS: 0
COLOR CHANGE: 0
ARTHRALGIAS: 0
HEADACHES: 1
BACK PAIN: 0
SINUS PRESSURE: 0
PALPITATIONS: 0
SINUS PAIN: 0
NAUSEA: 1
SORE THROAT: 1
FATIGUE: 0
CHOKING: 0
COUGH: 1
FACIAL ASYMMETRY: 0
APPETITE CHANGE: 0

## 2024-03-04 NOTE — PROGRESS NOTES
Subjective   Patient ID: Sarahi Booker is a 44 y.o. female who presents for Cough (With chest congestion. X Saturday. ) and Nausea (With diarrhea, fever, headache. X Wednesday. Neg for covid. ).    HPI   Reports she has had difficulty with cough with chest congestion since Saturday.  Had nausea diarrhea fever headache since Wednesday did take a COVID test that was negative.  Review of Systems   Constitutional:  Positive for fever. Negative for activity change, appetite change and fatigue.   HENT:  Positive for sore throat. Negative for congestion, ear pain, sinus pressure and sinus pain.    Respiratory:  Positive for cough. Negative for choking and chest tightness.    Cardiovascular:  Negative for chest pain, palpitations and leg swelling.   Gastrointestinal:  Positive for diarrhea and nausea.   Musculoskeletal:  Negative for arthralgias, back pain and gait problem.   Skin:  Negative for color change and pallor.   Neurological:  Positive for headaches. Negative for dizziness, facial asymmetry and light-headedness.       Objective   /88 (BP Location: Left arm)   Pulse 80   Temp 36.8 °C (98.2 °F)   Wt 57.9 kg (127 lb 9.6 oz)   LMP  (LMP Unknown)   BMI 23.34 kg/m²   BSA Body surface area is 1.59 meters squared.      Physical Exam  Constitutional:       General: She is not in acute distress.     Appearance: Normal appearance. She is not toxic-appearing.   HENT:      Head: Normocephalic.      Right Ear: Tympanic membrane, ear canal and external ear normal.      Left Ear: Tympanic membrane, ear canal and external ear normal.   Eyes:      Conjunctiva/sclera: Conjunctivae normal.      Pupils: Pupils are equal, round, and reactive to light.   Cardiovascular:      Rate and Rhythm: Normal rate and regular rhythm.      Pulses: Normal pulses.      Heart sounds: Normal heart sounds.   Pulmonary:      Effort: No respiratory distress.      Breath sounds: No wheezing, rhonchi or rales.   Abdominal:      Palpations:  Abdomen is soft.   Musculoskeletal:         General: No swelling or tenderness.   Skin:     Findings: No lesion or rash.   Neurological:      General: No focal deficit present.      Mental Status: She is alert and oriented to person, place, and time. Mental status is at baseline.      Gait: Gait normal.   Psychiatric:         Mood and Affect: Mood normal.         Behavior: Behavior normal.         Thought Content: Thought content normal.         Judgment: Judgment normal.       No visits with results within 1 Year(s) from this visit.   Latest known visit with results is:   Legacy Encounter on 12/28/2022   Component Date Value Ref Range Status    WBC 12/28/2022 6.1  4.4 - 11.3 x10E9/L Final    RBC 12/28/2022 3.51 (L)  4.00 - 5.20 x10E12/L Final    Hemoglobin 12/28/2022 11.6 (L)  12.0 - 16.0 g/dL Final    Hematocrit 12/28/2022 36.2  36.0 - 46.0 % Final    MCV 12/28/2022 103 (H)  80 - 100 fL Final    MCHC 12/28/2022 32.0  32.0 - 36.0 g/dL Final    Platelets 12/28/2022 342  150 - 450 x10E9/L Final    RDW 12/28/2022 13.1  11.5 - 14.5 % Final    Neutrophils % 12/28/2022 56.1  40.0 - 80.0 % Final    Immature Granulocytes %, Automated 12/28/2022 0.7  0.0 - 0.9 % Final    Comment:  Immature Granulocyte Count (IG) includes promyelocytes,    myelocytes and metamyelocytes but does not include bands.   Percent differential counts (%) should be interpreted in the   context of the absolute cell counts (cells/L).      Lymphocytes % 12/28/2022 33.3  13.0 - 44.0 % Final    Monocytes % 12/28/2022 6.4  2.0 - 10.0 % Final    Eosinophils % 12/28/2022 2.5  0.0 - 6.0 % Final    Basophils % 12/28/2022 1.0  0.0 - 2.0 % Final    Neutrophils Absolute 12/28/2022 3.42  1.20 - 7.70 x10E9/L Final    Lymphocytes Absolute 12/28/2022 2.03  1.20 - 4.80 x10E9/L Final    Monocytes Absolute 12/28/2022 0.39  0.10 - 1.00 x10E9/L Final    Eosinophils Absolute 12/28/2022 0.15  0.00 - 0.70 x10E9/L Final    Basophils Absolute 12/28/2022 0.06  0.00 - 0.10  x10E9/L Final    Vitamin D, 25-Hydroxy 12/28/2022 20 (A)  ng/mL Final    Comment: .  DEFICIENCY:         < 20   NG/ML  INSUFFICIENCY:      20-29  NG/ML  SUFFICIENCY:         NG/ML    THIS ASSAY ACCURATELY QUANTIFIES THE SUM OF  VITAMIN D3, 25-HYDROXY AND VIT D2,25-HYDROXY.      TSH 12/28/2022 4.55 (H)  0.44 - 3.98 mIU/L Final    Comment:  TSH testing is performed using different testing    methodology at [facility] than at other    [facility]. Direct result comparisons should    only be made within the same method.      Glucose 12/28/2022 74  74 - 99 mg/dL Final    Sodium 12/28/2022 137  136 - 145 mmol/L Final    Potassium 12/28/2022 4.3  3.5 - 5.3 mmol/L Final    Chloride 12/28/2022 101  98 - 107 mmol/L Final    Bicarbonate 12/28/2022 28  21 - 32 mmol/L Final    Anion Gap 12/28/2022 12  10 - 20 mmol/L Final    Urea Nitrogen 12/28/2022 9  6 - 23 mg/dL Final    Creatinine 12/28/2022 0.73  0.50 - 1.05 mg/dL Final    GFR Female 12/28/2022 >90  >90 mL/min/1.73m2 Final    Comment:  CALCULATIONS OF ESTIMATED GFR ARE PERFORMED   USING THE 2021 CKD-EPI STUDY REFIT EQUATION   WITHOUT THE RACE VARIABLE FOR THE IDMS-TRACEABLE   CREATININE METHODS.    https://jasn.asnjournals.org/content/early/2021/09/22/ASN.9550560896      Calcium 12/28/2022 9.0  8.6 - 10.3 mg/dL Final    Albumin 12/28/2022 4.1  3.4 - 5.0 g/dL Final    Alkaline Phosphatase 12/28/2022 83  33 - 110 U/L Final    Total Protein 12/28/2022 7.2  6.4 - 8.2 g/dL Final    AST 12/28/2022 45 (H)  9 - 39 U/L Final    Total Bilirubin 12/28/2022 0.4  0.0 - 1.2 mg/dL Final    ALT (SGPT) 12/28/2022 47 (H)  7 - 45 U/L Final    Comment:  Patients treated with Sulfasalazine may generate    falsely decreased results for ALT.      Cholesterol 12/28/2022 213 (H)  0 - 199 mg/dL Final    Comment: .      AGE      DESIRABLE   BORDERLINE HIGH   HIGH     0-19 Y     0 - 169       170 - 199     >/= 200    20-24 Y     0 - 189       190 - 224     >/= 225         >24  Y     0 - 199       200 - 239     >/= 240   **All ranges are based on fasting samples. Specific   therapeutic targets will vary based on patient-specific   cardiac risk.  .   Pediatric guidelines reference:Pediatrics 2011, 128(S5).   Adult guidelines reference: NCEP ATPIII Guidelines,     DM 2001, 258:2486-97  .   Venipuncture immediately after or during the    administration of Metamizole may lead to falsely   low results. Testing should be performed immediately   prior to Metamizole dosing.      HDL 12/28/2022 53.5  mg/dL Final    Comment: .      AGE      VERY LOW   LOW     NORMAL    HIGH       0-19 Y       < 35   < 40     40-45     ----    20-24 Y       ----   < 40       >45     ----      >24 Y       ----   < 40     40-60      >60  .      Cholesterol/HDL Ratio 12/28/2022 4.0   Final    Comment: REF VALUES  DESIRABLE  < 3.4  HIGH RISK  > 5.0      LDL 12/28/2022 103 (H)  0 - 99 mg/dL Final    Comment: .                           NEAR      BORD      AGE      DESIRABLE  OPTIMAL    HIGH     HIGH     VERY HIGH     0-19 Y     0 - 109     ---    110-129   >/= 130     ----    20-24 Y     0 - 119     ---    120-159   >/= 160     ----      >24 Y     0 -  99   100-129  130-159   160-189     >/=190  .      VLDL 12/28/2022 56 (H)  0 - 40 mg/dL Final    Triglycerides 12/28/2022 281 (H)  0 - 149 mg/dL Final    Comment: .      AGE      DESIRABLE   BORDERLINE HIGH   HIGH     VERY HIGH   0 D-90 D    19 - 174         ----         ----        ----  91 D- 9 Y     0 -  74        75 -  99     >/= 100      ----    10-19 Y     0 -  89        90 - 129     >/= 130      ----    20-24 Y     0 - 114       115 - 149     >/= 150      ----         >24 Y     0 - 149       150 - 199    200- 499    >/= 500  .   Venipuncture immediately after or during the    administration of Metamizole may lead to falsely   low results. Testing should be performed immediately   prior to Metamizole dosing.      Non HDL Cholesterol 12/28/2022 160  mg/dL Final     Comment:     AGE      DESIRABLE   BORDERLINE HIGH   HIGH     VERY HIGH     0-19 Y     0 - 119       120 - 144     >/= 145    >/= 160    20-24 Y     0 - 149       150 - 189     >/= 190      ----         >24 Y    30 MG/DL ABOVE LDL CHOLESTEROL GOAL  .      Free T4 12/28/2022 0.76  0.61 - 1.12 ng/dL Final    Comment:  Thyroxine Free testing is performed using different testing    methodology at [facility] than at other    [facility]. Direct result comparisons should    only be made within the same method.  .   Biotin can cause falsely elevated free T4 results. Patients   taking a Biotin dose of up to 10 mg/day should refrain from   taking Biotin for 24 hours before sample collection. Patient   taking a Biotin dose of >10 mg/day should consult with their   physician or the laboratory before the blood draw.       Current Outpatient Medications on File Prior to Visit   Medication Sig Dispense Refill    aspirin 81 mg chewable tablet Chew 1 tablet (81 mg) once daily.      citalopram (CeleXA) 40 mg tablet Take 1 tablet (40 mg) by mouth once daily.      famotidine (Pepcid) 20 mg tablet Take 1 tablet (20 mg) by mouth 2 times a day.      meclizine (Antivert) 25 mg tablet Take 1 tablet (25 mg) by mouth if needed.      pantoprazole (ProtoNix) 40 mg EC tablet Take 1 tablet (40 mg) by mouth once daily.       No current facility-administered medications on file prior to visit.     No images are attached to the encounter.            Assessment/Plan   Diagnoses and all orders for this visit:  Sore throat  Acute bronchitis, unspecified organism  Patient to start on antibiotics  Patient to call for questions or concerns

## 2024-03-05 LAB
RSV RNA RESP QL NAA+PROBE: NOT DETECTED
SARS-COV-2 RNA RESP QL NAA+PROBE: NOT DETECTED